# Patient Record
Sex: FEMALE | URBAN - METROPOLITAN AREA
[De-identification: names, ages, dates, MRNs, and addresses within clinical notes are randomized per-mention and may not be internally consistent; named-entity substitution may affect disease eponyms.]

---

## 2022-02-04 ENCOUNTER — HOSPITAL ENCOUNTER (OUTPATIENT)
Dept: PHYSICAL THERAPY | Age: 22
End: 2022-02-04

## 2022-02-15 ENCOUNTER — HOSPITAL ENCOUNTER (OUTPATIENT)
Dept: PHYSICAL THERAPY | Age: 22
Discharge: HOME | End: 2022-02-15
Payer: COMMERCIAL

## 2022-02-15 PROCEDURE — 97110 THERAPEUTIC EXERCISES: CPT

## 2022-02-15 PROCEDURE — 97535 SELF CARE MNGMENT TRAINING: CPT

## 2022-02-15 PROCEDURE — 97161 PT EVAL LOW COMPLEX 20 MIN: CPT

## 2022-02-15 NOTE — PROGRESS NOTES
[facility]  [address]  [place], [address]  [phone] fax    Plan of Care/ Statement of Necessity for Physical Therapy Services    Patient name: Annette Miller Start of Care: 2/15/2022   Referral source: Mk Myers NP : 2000    Medical Diagnosis: Low back pain [M54.50]  Payor: Javier Wheeler / Plan: Enriqueta Ruffin PPO / Product Type: PPO /  Onset Date:2021    Treatment Diagnosis: LBP   Prior Hospitalization: see medical history Provider#: [id number]   Medications: Verified on Patient summary List    Comorbidities: None reported   Prior Level of Function: Pt unlimited with sitting and activity participation     The Plan of Care and following information is based on the information from the initial evaluation. Assessment/ key information: The pt is a 23 y/o F presenting with c/o left sided LBP that will refer into left LE intermittently. She reports uncertainty of mechanism but reports most severe pain around November that has improved at this time. She is however limited with sitting tolerance and activity participation due to intermittent symptoms. Pt demonstrate provocation of pain with combined trunk flexion/rotation to right. Also noted with palpation at L5-S1 region and sacral ala, to lesser extent in left lumbar paraspinals. Good hip mobility noted bilaterally, some limitation to hip strength today. Negative TTP through hip musculature or with piriformis stress testing. Signs and symptoms consistent with mechanical back/SIJ pain. Pt would benefit from PT to improve ROM, strength and pain to return to PLOF.     Evaluation Complexity History LOW Complexity : Zero comorbidities / personal factors that will impact the outcome / POC; Examination HIGH Complexity : 4+ Standardized tests and measures addressing body structure, function, activity limitation and / or participation in recreation  ;Presentation LOW Complexity : Stable, uncomplicated ;Clinical Decision Making MEDIUM Complexity : FOTO score of 26-74  Overall Complexity Rating: LOW   Problem List: pain affecting function, decrease ROM, decrease strength, decrease ADL/ functional abilitiies, decrease activity tolerance and decrease flexibility/ joint mobility   Treatment Plan may include any combination of the following: Therapeutic exercise, Therapeutic activities, Neuromuscular re-education, Physical agent/modality, Manual therapy, Patient education, Self Care training and Functional mobility training  Patient / Family readiness to learn indicated by: asking questions and trying to perform skills  Persons(s) to be included in education: patient (P)  Barriers to Learning/Limitations: yes;  other scheduling  Patient Goal (s): Try to relieve the pain  Patient Self Reported Health Status: excellent  Rehabilitation Potential: excellent    Short Term Goals: To be accomplished in 2 weeks:  1. Pt will demonstrate I and compliance with HEP to maximize therapeutic effect. 2. Pt will demonstrate WNL flexion + rotation trunk AROM B without discomfort to improve posterior chain flexibility with ADLs. Long Term Goals: To be accomplished in 4 weeks:  1. Pt will demonstrate neutral pelvic alignment x 3 sessions to improve lumbopelvic mechanics with ADLs. 2. Pt will demonstrate 5/5 B hip abduction and extension for improved core stability with recreation. 3. Pt will report abolishment of left LE paresthesias to improve ease of classroom and work performance. 4. Pt will improve FOTO score to 84 to demonstrate improved quality of life and function. Frequency / Duration: Patient to be seen 2 times per week for 4 weeks.     Patient/ Caregiver education and instruction: Diagnosis, prognosis, self care, activity modification and exercises   [x]  Plan of care has been reviewed with MOUSTAPHA Moseley DPT CMTPT 2/15/2022 1:31 PM    ________________________________________________________________________    I certify that the above Therapy Services are being furnished while the patient is under my care. I agree with the treatment plan and certify that this therapy is necessary.     [de-identified] Signature:____________Date:_________TIME:________     Lloyd Swenson NP  ** Signature, Date and Time must be completed for valid certification **    Please sign and return to [facility] [address] [place] [address] & [facility]  [address]  [place], [address]  [phone] fax

## 2022-02-15 NOTE — PROGRESS NOTES
PT DAILY TREATMENT NOTE     Patient Name: Naomi Galvez  Date:2/15/2022  : 2000  [x]  Patient  Verified  Payor: Asael Yepez / Plan: [place] [employer] P[place] / Product Type: PPO /    In time:11:14  Out time:11:59  Total Treatment Time (min): 44  Visit #: 1 of 8    Treatment Area: Low back pain [M54.50]    SUBJECTIVE  Pain Level (0-10 scale): 2  Any medication changes, allergies to medications, adverse drug reactions, diagnosis change, or new procedure performed?: [x] No    [] Yes (see summary sheet for update)  Subjective functional status/changes:   [] No changes reported  The pt reports pain with sitting that limits her ADL ease    OBJECTIVE    24 min [x]Eval                  []Re-Eval       10 min Therapeutic Exercise:  [] See flow sheet :   Rationale: increase ROM, increase strength and increase proprioception to improve the patients ability to perform ADLs    10 min Therapeutic Activity:  []  See flow sheet :   Rationale: pt education and instruction  to improve the patients ability to self manage symptoms    With   [] TE   [] TA   [] neuro   [] other: Patient Education: [x] Review HEP    [] Progressed/Changed HEP based on:   [] positioning   [] body mechanics   [] transfers   [] heat/ice application    [] other:      Other Objective/Functional Measures:      Pain Level (0-10 scale) post treatment: 3    ASSESSMENT/Changes in Function: see POC    Patient will continue to benefit from skilled PT services to modify and progress therapeutic interventions, address functional mobility deficits, address ROM deficits, address strength deficits, analyze and address soft tissue restrictions, analyze and cue movement patterns and analyze and modify body mechanics/ergonomics to attain remaining goals. [x]  See Plan of Care  []  See progress note/recertification  []  See Discharge Summary         Progress towards goals / Updated goals:  Short Term Goals: To be accomplished in 2 weeks:  1.  Pt will demonstrate I and compliance with HEP to maximize therapeutic effect. IE: HEP issued and instructed  2. Pt will demonstrate WNL flexion + rotation trunk AROM B without discomfort to improve posterior chain flexibility with ADLs. IE: (+) pain to right  Long Term Goals: To be accomplished in 4 weeks:  1. Pt will demonstrate neutral pelvic alignment x 3 sessions to improve lumbopelvic mechanics with ADLs. IE: left upslip noted  2. Pt will demonstrate 5/5 B hip abduction and extension for improved core stability with recreation. IE: 4/5 abduction 5/5 extension some lumbar tension  3. Pt will report abolishment of left LE paresthesias to improve ease of classroom and work performance. IE: noted with prolonged sitting  4. Pt will improve FOTO score to 84 to demonstrate improved quality of life and function.    IE: 76    PLAN  []  Upgrade activities as tolerated     [x]  Continue plan of care  []  Update interventions per flow sheet       []  Discharge due to:_  []  Other:_      Karen Campos PT CMTPT 2/15/2022  1:46 PM    Future Appointments   Date Time Provider Brandan Carson   2/24/2022 12:00 PM Julio Cesar Jhaveri [facility]   2/25/2022 11:15 AM [place], [address] [facility]   3/1/2022  9:15 AM Chris Faust, PT [facility]   3/3/2022 12:00 PM Jenniffer Mcknight, PT [facility]   3/15/2022  9:15 AM Chris Faust, PT [facility]   3/17/2022 11:15 AM Kostas Galindo [facility]   3/22/2022  9:15 AM Chris Faust, PT [facility]   3/24/2022 11:15 AM Kostas Galindo [facility]

## 2022-02-24 ENCOUNTER — HOSPITAL ENCOUNTER (OUTPATIENT)
Dept: PHYSICAL THERAPY | Age: 22
Discharge: HOME | End: 2022-02-24
Payer: COMMERCIAL

## 2022-02-24 PROCEDURE — 97112 NEUROMUSCULAR REEDUCATION: CPT

## 2022-02-24 PROCEDURE — 97140 MANUAL THERAPY 1/> REGIONS: CPT

## 2022-02-24 PROCEDURE — 97110 THERAPEUTIC EXERCISES: CPT

## 2022-02-24 NOTE — PROGRESS NOTES
PT DAILY TREATMENT NOTE     Patient Name: Tiffanie Guajardo  Date:2022  : 2000  [x]  Patient  Verified  Payor: Benson Delgado / Plan: [place] [employer] [place] / Product Type: PPO /    In time:12:00  Out time:12:42  Total Treatment Time (min): 42  Visit #: 2 of 8    Treatment Area: Low back pain [M54.50]    SUBJECTIVE  Pain Level (0-10 scale): 2  Any medication changes, allergies to medications, adverse drug reactions, diagnosis change, or new procedure performed?: [x] No    [] Yes (see summary sheet for update)  Subjective functional status/changes:   [] No changes reported  The pt reports today is the first day she has felt noticeable discomfort. Not radiating much past the buttock though    OBJECTIVE    11 min Therapeutic Exercise:  [] See flow sheet :   Rationale: increase ROM and increase strength to improve the patients ability to perform daily tasks and self care     23 min Neuromuscular Re-education:  []  See flow sheet :   Rationale: increase strength and increase proprioception  to improve the patients ability to perform functional tasks with improved mechanics and stability    8 min Manual Therapy:  Left Sacral PAs grade III, left LE traction in prone for each   The manual therapy interventions were performed at a separate and distinct time from the therapeutic activities interventions. Rationale: decrease pain and increase tissue extensibility to improve ease of ADLs            With   [] TE   [] TA   [] neuro   [] other: Patient Education: [x] Review HEP    [] Progressed/Changed HEP based on:   [] positioning   [] body mechanics   [] transfers   [] heat/ice application    [] other:      Other Objective/Functional Measures: good pelvic stability noted post session      Pain Level (0-10 scale) post treatment: 0    ASSESSMENT/Changes in Function: The pt demonstrates excellent performance of exercise today.  Reporting improved symptoms since evaluation, some TTP at left sacral ala still present but improved sitting tolerance. Continue with gradual progression of core stability work     Patient will continue to benefit from skilled PT services to modify and progress therapeutic interventions, address functional mobility deficits, address ROM deficits, address strength deficits, analyze and address soft tissue restrictions, analyze and cue movement patterns and analyze and modify body mechanics/ergonomics to attain remaining goals. []  See Plan of Care  []  See progress note/recertification  []  See Discharge Summary         Progress towards goals / Updated goals:  Short Term Goals: To be accomplished in 2 weeks:  1. Pt will demonstrate I and compliance with HEP to maximize therapeutic effect. IE: HEP issued and instructed   Current: Met- compliant with HEP 2/24/2022  2. Pt will demonstrate WNL flexion + rotation trunk AROM B without discomfort to improve posterior chain flexibility with ADLs. IE: (+) pain to right  Long Term Goals: To be accomplished in 4 weeks:  1. Pt will demonstrate neutral pelvic alignment x 3 sessions to improve lumbopelvic mechanics with ADLs. IE: left upslip noted  2. Pt will demonstrate 5/5 B hip abduction and extension for improved core stability with recreation. IE: 4/5 abduction 5/5 extension some lumbar tension  3. Pt will report abolishment of left LE paresthesias to improve ease of classroom and work performance. IE: noted with prolonged sitting  4. Pt will improve FOTO score to 84 to demonstrate improved quality of life and function.               IE: 76    PLAN  []  Upgrade activities as tolerated     []  Continue plan of care  []  Update interventions per flow sheet       []  Discharge due to:_  []  Other:_      Gaye Ackerman 2/24/2022  12:00 PM    Future Appointments   Date Time Provider Brandan Carson   2/25/2022 11:15 AM Celso [address] [place] [address]   3/1/2022  9:15 AM Carmelo Shin PT [facility]   3/3/2022 12:00 PM Kristen Light, PT [facility]   3/15/2022  9:15 AM Radha Marin, PT [facility]   3/17/2022 11:15 AM Donna Sorensen [facility]   3/22/2022  9:15 AM Radha Marin, PT [facility]   3/24/2022 11:15 AM Donna Sorensen [facility]

## 2022-02-25 ENCOUNTER — HOSPITAL ENCOUNTER (OUTPATIENT)
Dept: PHYSICAL THERAPY | Age: 22
Discharge: HOME | End: 2022-02-25
Payer: COMMERCIAL

## 2022-02-25 PROCEDURE — 97140 MANUAL THERAPY 1/> REGIONS: CPT

## 2022-02-25 PROCEDURE — 97112 NEUROMUSCULAR REEDUCATION: CPT

## 2022-02-25 PROCEDURE — 97110 THERAPEUTIC EXERCISES: CPT

## 2022-02-25 NOTE — PROGRESS NOTES
PT DAILY TREATMENT NOTE     Patient Name: Aarti Torres  Date:2022  : 2000  [x]  Patient  Verified  Payor: Jose L Martínez / Plan: [place] [employer] PPO / Product Type: PPO /    In time:11:15  Out time:11:49  Total Treatment Time (min): 34  Visit #: 3 of 8    Treatment Area: Low back pain [M54.50]    SUBJECTIVE  Pain Level (0-10 scale): 0  Any medication changes, allergies to medications, adverse drug reactions, diagnosis change, or new procedure performed?: [x] No    [] Yes (see summary sheet for update)  Subjective functional status/changes:   [] No changes reported  The pt denies any soreness or pain. No issues after yesterday's session    OBJECTIVE    10 min Therapeutic Exercise:  [] See flow sheet :   Rationale: increase ROM and increase strength to improve the patients ability to perform daily tasks and self care    16 min Neuromuscular Re-education:  []  See flow sheet :   Rationale: increase strength and increase proprioception  to improve the patients ability to perform activities with improved core stability and engagement    8 min Manual Therapy:  Left sacral PAs grade III in prone, left anterior innominate MET   The manual therapy interventions were performed at a separate and distinct time from the therapeutic activities interventions. Rationale: increase ROM and increase tissue extensibility to improve mechanics and stability with ADLs         With   [] TE   [] TA   [] neuro   [] other: Patient Education: [x] Review HEP    [] Progressed/Changed HEP based on:   [] positioning   [] body mechanics   [] transfers   [] heat/ice application    [] other:      Other Objective/Functional Measures:      Pain Level (0-10 scale) post treatment: 0    ASSESSMENT/Changes in Function: The pt demonstrates some improved sacral alignment today, mild innominate rotation. Continued good form and response to exercise, added UE Reformer series, pt reports some mild stretch discomfort through left sacral region.  Good alignment stability through session, no pain post session. Continue with core stability work progressing positions as able    Patient will continue to benefit from skilled PT services to modify and progress therapeutic interventions, address functional mobility deficits, address ROM deficits, address strength deficits, analyze and address soft tissue restrictions, analyze and cue movement patterns and analyze and modify body mechanics/ergonomics to attain remaining goals. []  See Plan of Care  []  See progress note/recertification  []  See Discharge Summary         Progress towards goals / Updated goals:  Short Term Goals: To be accomplished in 2 weeks:  1. Pt will demonstrate I and compliance with HEP to maximize therapeutic effect.              BG: HEP issued and instructed              Current: Met- compliant with HEP 2/24/2022  2. Pt will demonstrate WNL flexion + rotation trunk AROM B without discomfort to improve posterior chain flexibility with ADLs.             GY: (+) pain to right   Current: mild soreness to right but improved 2/25/2022  Long Term Goals: To be accomplished in 4 weeks:  1. Pt will demonstrate neutral pelvic alignment x 3 sessions to improve lumbopelvic mechanics with ADLs.             FP: left upslip noted   Current: progressing, mild anterior rotation left 2/25/2022  2. Pt will demonstrate 5/5 B hip abduction and extension for improved core stability with recreation.              IE: 4/5 abduction 5/5 extension some lumbar tension  3. Pt will report abolishment of left LE paresthesias to improve ease of classroom and work performance.              IE: noted with prolonged sitting  4.  Pt will improve FOTO score to 84 to demonstrate improved quality of life and function.              IE: 68    PLAN  [x]  Upgrade activities as tolerated     []  Continue plan of care  []  Update interventions per flow sheet       []  Discharge due to:_  []  Other:_      Jeffrey Ingram DPT CMTPT 2/25/2022  11:22 AM    Future Appointments   Date Time Provider Brandan Carson   3/1/2022  9:15 AM Sara Carrier, PT [facility]   3/3/2022 12:00 PM Beth Belle, PT [facility]   3/15/2022  9:15 AM Sara Carrier, PT [facility]   3/17/2022 11:15 AM Deloras Dage [facility]   3/22/2022  9:15 AM Sara Carrier, PT [facility]   3/24/2022 11:15 AM Deloras Dage [facility]

## 2022-03-01 ENCOUNTER — HOSPITAL ENCOUNTER (OUTPATIENT)
Dept: PHYSICAL THERAPY | Age: 22
Discharge: HOME | End: 2022-03-01
Payer: COMMERCIAL

## 2022-03-01 PROCEDURE — 97140 MANUAL THERAPY 1/> REGIONS: CPT

## 2022-03-01 PROCEDURE — 97110 THERAPEUTIC EXERCISES: CPT

## 2022-03-01 PROCEDURE — 97112 NEUROMUSCULAR REEDUCATION: CPT

## 2022-03-01 NOTE — PROGRESS NOTES
PT DAILY TREATMENT NOTE     Patient Name: Tatianna Hinkle  Date:3/1/2022  : 2000  [x]  Patient  Verified  Payor: Mo Bradshaw / Plan: [place] [employer] PPO / Product Type: PPO /    In time:911  Out time:949  Total Treatment Time (min): 38  Visit #: 4 of 8      Treatment Area: Low back pain [M54.50]    SUBJECTIVE  Pain Level (0-10 scale): 0  Any medication changes, allergies to medications, adverse drug reactions, diagnosis change, or new procedure performed?: [x] No    [] Yes (see summary sheet for update)  Subjective functional status/changes:   [] No changes reported  The pt reports some increased soreness after playing [employer]. OBJECTIVE      10 min Therapeutic Exercise:  [x] See flow sheet :   Rationale: increase ROM and increase strength to improve the patients ability to perform ADL       20 min Neuromuscular Re-education:  [x]  See flow sheet :   Rationale: increase strength, improve coordination and increase proprioception  to improve the patients ability to perform ADL    8 min Manual Therapy:  Pt prone- performed STM to lower lumbar paraspinals and use STM hook instrument to the left glute max, left LE distraction   The manual therapy interventions were performed at a separate and distinct time from the therapeutic activities interventions. Rationale: decrease pain, increase ROM and increase tissue extensibility to perform ADL            With   [] TE   [] TA   [] neuro   [] other: Patient Education: [x] Review HEP    [] Progressed/Changed HEP based on:   [] positioning   [] body mechanics   [] transfers   [] heat/ice application    [] other:      Other Objective/Functional Measures: progressed therex per flow sheet, level SI alignment     Pain Level (0-10 scale) post treatment: 0    ASSESSMENT/Changes in Function:  The pt was challenged with side plank with hip abduction. Good SI alignment noted today. No pain increase reported throughout treatment.      Patient will continue to benefit from skilled PT services to modify and progress therapeutic interventions, address functional mobility deficits, address strength deficits, analyze and address soft tissue restrictions and analyze and cue movement patterns to attain remaining goals. []  See Plan of Care  []  See progress note/recertification  []  See Discharge Summary         Progress towards goals / Updated goals:  Short Term Goals: To be accomplished in 2 weeks:  1. Pt will demonstrate I and compliance with HEP to maximize therapeutic effect.              DW: HEP issued and instructed              [id number]: Met- compliant with HEP 2/24/2022  2. Pt will demonstrate WNL flexion + rotation trunk AROM B without discomfort to improve posterior chain flexibility with ADLs.             FY: (+) pain to right              Current: mild soreness to right but improved 2/25/2022  Long Term Goals: To be accomplished in 4 weeks:  1. Pt will demonstrate neutral pelvic alignment x 3 sessions to improve lumbopelvic mechanics with ADLs.             WR: left upslip noted              Current:first level alignment noted 3-1-22  2. Pt will demonstrate 5/5 B hip abduction and extension for improved core stability with recreation.              IE: 4/5 abduction 5/5 extension some lumbar tension  3. Pt will report abolishment of left LE paresthesias to improve ease of classroom and work performance.              IE: noted with prolonged sitting  4.  Pt will improve FOTO score to 84 to demonstrate improved quality of life and function.              IE: 68    PLAN  []  Upgrade activities as tolerated     [x]  Continue plan of care  []  Update interventions per flow sheet       []  Discharge due to:_  []  Other:_      Jeffrey Rosen, PT 3/1/2022  9:05 AM    Future Appointments   Date Time Provider Brandan Carson   3/1/2022  9:15 AM Dayana Irving, PT [facility]   3/3/2022 12:00 PM Boris Yang, PT [facility]   3/15/2022  9:15 AM Zander Edwards, PT [facility] 3/17/2022 11:15 AM Mitch Leong [facility]   3/22/2022  9:15 AM Colletta Patch, PT [facility]   3/24/2022 11:15 [facility]

## 2022-03-03 ENCOUNTER — HOSPITAL ENCOUNTER (OUTPATIENT)
Dept: PHYSICAL THERAPY | Age: 22
Discharge: HOME | End: 2022-03-03
Payer: COMMERCIAL

## 2022-03-03 PROCEDURE — 97140 MANUAL THERAPY 1/> REGIONS: CPT

## 2022-03-03 PROCEDURE — 97112 NEUROMUSCULAR REEDUCATION: CPT

## 2022-03-03 PROCEDURE — 97110 THERAPEUTIC EXERCISES: CPT

## 2022-03-03 NOTE — PROGRESS NOTES
PT DAILY TREATMENT NOTE     Patient Name: Unknown Sandifer  Date:3/3/2022  : 2000  [x]  Patient  Verified  Payor: Lashonda Arreola / Plan: [place] [employer] / Product Type: PPO /    In time:11:58  Out time: 12:39  Total Treatment Time (min): 41  Visit #: 5 of 8    Treatment Area: Low back pain [M54.50]    SUBJECTIVE  Pain Level (0-10 scale): 0/10  Any medication changes, allergies to medications, adverse drug reactions, diagnosis change, or new procedure performed?: [x] No    [] Yes (see summary sheet for update)  Subjective functional status/changes:   [] No changes reported  The patient reports \"just a little soreness with certain motions\". She indicates her pain has improved. OBJECTIVE  18 min Therapeutic Exercise:  [x] See flow sheet :   Rationale: increase ROM and increase strength to improve the patients ability to improve ADL ease. 15 min Neuromuscular Re-education:  [x]  See flow sheet :   Rationale: increase ROM and increase strength  to improve the patients ability to improve ADL ease. 8 min Manual Therapy:  Left glute medius TPR/MFR with the patient in prone   The manual therapy interventions were performed at a separate and distinct time from the therapeutic activities interventions. Rationale: decrease pain, increase ROM and increase tissue extensibility to improve ADL ease. With   [] TE   [] TA   [] neuro   [] other: Patient Education: [x] Review HEP    [] Progressed/Changed HEP based on:   [] positioning   [] body mechanics   [] transfers   [] heat/ice application    [] other:      Other Objective/Functional Measures:   Pelvic alignment WNL     Pain Level (0-10 scale) post treatment: 0/10    ASSESSMENT/Changes in Function: Cues required in order to maintain pelvic neutral during core strengthening with notable anterior pelvic tilt tendency with core weakness evident. The patient is going out of town for a week and plans to return for treatment upon her return.      Patient will continue to benefit from skilled PT services to modify and progress therapeutic interventions, address functional mobility deficits, address ROM deficits, address strength deficits, analyze and address soft tissue restrictions, analyze and cue movement patterns, analyze and modify body mechanics/ergonomics, assess and modify postural abnormalities and instruct in home and community integration to attain remaining goals. []  See Plan of Care  []  See progress note/recertification  []  See Discharge Summary         Progress towards goals / Updated goals:  Short Term Goals: To be accomplished in 2 weeks:  1. Pt will demonstrate I and compliance with HEP to maximize therapeutic effect.              HR: HEP issued and instructed              [id number]: Met- compliant with HEP 2/24/2022  2. Pt will demonstrate WNL flexion + rotation trunk AROM B without discomfort to improve posterior chain flexibility with ADLs.             ON: (+) pain to right              Current: mild soreness to right but improved 2/25/2022  Long Term Goals: To be accomplished in 4 weeks:  1. Pt will demonstrate neutral pelvic alignment x 3 sessions to improve lumbopelvic mechanics with ADLs.             SH: left upslip noted              Current:Level x 2 today 3/03/2022  2. Pt will demonstrate 5/5 B hip abduction and extension for improved core stability with recreation.              IE: 4/5 abduction 5/5 extension some lumbar tension  3. Pt will report abolishment of left LE paresthesias to improve ease of classroom and work performance.              IE: noted with prolonged sitting  4.  Pt will improve FOTO score to 84 to demonstrate improved quality of life and function.              IE: 68    PLAN  [x]  Upgrade activities as tolerated     []  Continue plan of care  []  Update interventions per flow sheet       []  Discharge due to:_  []  Other:_      Ray Kumar PT 3/3/2022  12:05 PM    Future Appointments   Date Time Provider Brandan Carson   3/15/2022  9:15 AM Chris Faust, PT [facility]   3/17/2022 11:15 AM Kostas [facility]   3/22/2022  9:15 AM Chris Faust, PT [facility]   3/24/2022 11:15 AM Kostas Galindo [facility]

## 2022-03-15 ENCOUNTER — HOSPITAL ENCOUNTER (OUTPATIENT)
Dept: PHYSICAL THERAPY | Age: 22
Discharge: HOME | End: 2022-03-15
Payer: COMMERCIAL

## 2022-03-15 PROCEDURE — 97112 NEUROMUSCULAR REEDUCATION: CPT

## 2022-03-15 PROCEDURE — 97140 MANUAL THERAPY 1/> REGIONS: CPT

## 2022-03-15 PROCEDURE — 97110 THERAPEUTIC EXERCISES: CPT

## 2022-03-15 NOTE — PROGRESS NOTES
PT DAILY TREATMENT NOTE     Patient Name: Ramesh William  Date:3/15/2022  : 2000  [x]  Patient  Verified  Payor: Vernon Marks / [place]: [place] [employer] / Product Type: PPO /    In JXOF:[id number]  Out time:0955  Total Treatment Time (min): 40  Visit #: 6 of 8      Treatment Area: Low back pain [M54.50]    SUBJECTIVE  Pain Level (0-10 scale): 0  Any medication changes, allergies to medications, adverse drug reactions, diagnosis change, or new procedure performed?: [x] No    [] Yes (see summary sheet for update)  Subjective functional status/changes:   [] No changes reported  The pt reports her pain is no longer constant but she still does feel it with certain movements. OBJECTIVE        17 min Therapeutic Exercise:  [x] See flow sheet :   Rationale: increase ROM and increase strength to improve the patients ability to perform ADL       15 min Neuromuscular Re-education:  [x]  See flow sheet : Reformer series, Davon chops, lifts   Rationale: increase strength, improve coordination and increase proprioception  to improve the patients ability to perform ADL    8 min Manual Therapy:  STM left lumbar paraspinals, TPR glute med and STM stick to glute med- pt prone   The manual therapy interventions were performed at a separate and distinct time from the therapeutic activities interventions. Rationale: decrease pain, increase ROM and increase tissue extensibility to perform ADL            With   [] TE   [] TA   [] neuro   [] other: Patient Education: [x] Review HEP    [] Progressed/Changed HEP based on:   [] positioning   [] body mechanics   [] transfers   [] heat/ice application    [] other:      Other Objective/Functional Measures: added clams ER     Pain Level (0-10 scale) post treatment: 0    ASSESSMENT/Changes in Function: The pt is progressing well with PT. Pain is now intermittent and she is progressing with stability.      Patient will continue to benefit from skilled PT services to modify and progress therapeutic interventions, address functional mobility deficits, address ROM deficits, address strength deficits, analyze and address soft tissue restrictions, analyze and cue movement patterns and analyze and modify body mechanics/ergonomics to attain remaining goals. []  See Plan of Care  []  See progress note/recertification  []  See Discharge Summary         Progress towards goals / Updated goals:  Short Term Goals: To be accomplished in 2 weeks:  1. Pt will demonstrate I and compliance with HEP to maximize therapeutic effect.              WL: HEP issued and instructed              IHWLAZE: Met- compliant with HEP 2/24/2022  2. Pt will demonstrate WNL flexion + rotation trunk AROM B without discomfort to improve posterior chain flexibility with ADLs.             LETICIA: (+) pain to right              Current: mild soreness to right but improved 2/25/2022  Long Term Goals: To be accomplished in 4 weeks:  1. Pt will demonstrate neutral pelvic alignment x 3 sessions to improve lumbopelvic mechanics with ADLs.             AN: left upslip noted              Current:Level x 2 today 3/03/2022  2. Pt will demonstrate 5/5 B hip abduction and extension for improved core stability with recreation.              IE: 4/5 abduction 5/5 extension some lumbar tension  3. Pt will report abolishment of left LE paresthesias to improve ease of classroom and work performance.              IE: noted with prolonged sitting  4.  Pt will improve FOTO score to 84 to demonstrate improved quality of life and function.              IE: 68    PLAN  []  Upgrade activities as tolerated     [x]  Continue plan of care  []  Update interventions per flow sheet       []  Discharge due to:_  []  Other:_      Jordy Teran, PT 3/15/2022  9:22 AM    Future Appointments   Date Time Provider Brandan Powelli   3/17/2022 11:15 AM Cel[place], [address] [employer] [facility]   3/22/2022  9:15 AM Albert Sutton, PT [facility]   3/24/2022 11:15 AM Sherie Lucas [facility]

## 2022-03-17 ENCOUNTER — HOSPITAL ENCOUNTER (OUTPATIENT)
Dept: PHYSICAL THERAPY | Age: 22
Discharge: HOME | End: 2022-03-17
Payer: COMMERCIAL

## 2022-03-17 PROCEDURE — 97110 THERAPEUTIC EXERCISES: CPT

## 2022-03-17 PROCEDURE — 97112 NEUROMUSCULAR REEDUCATION: CPT

## 2022-03-17 PROCEDURE — 97140 MANUAL THERAPY 1/> REGIONS: CPT

## 2022-03-17 NOTE — PROGRESS NOTES
[facility]  [address]  [place], [address]  [phone] fax    Physical Therapy Discharge Summary  Patient name: Geoff Helms Start of Care: 2/15/2022   Referral source: Seymour Bender NP : 2000                Medical Diagnosis: Low back pain [M54.50]  Payor: Poornima Morris / Plan: [employer] PPO / Product Type: PPO /  Onset Date:2021                Treatment Diagnosis: LBP   Prior Hospitalization: see medical history Provider#: [id number]   Medications: Verified on Patient summary List    Comorbidities: None reported   Prior Level of Function: Pt unlimited with sitting and activity participation  Visits from Start of Care: 7    Missed Visits: 0  Reporting Period : 2/15/2022 to 3/17/2022      Summary of Care:  Short Term Goals: To be accomplished in 2 weeks:  1. Pt will demonstrate I and compliance with HEP to maximize therapeutic effect.              IJ: HEP issued and instructed              [id number]: Met- compliant with HEP 2022  2. Pt will demonstrate WNL flexion + rotation trunk AROM B without discomfort to improve posterior chain flexibility with ADLs.             QC: (+) pain to right              Current: mild soreness to right but improved 2022; noted intermittently to right 3/17/2022  Long Term Goals: To be accomplished in 4 weeks:  1. Pt will demonstrate neutral pelvic alignment x 3 sessions to improve lumbopelvic mechanics with ADLs.             BR: left upslip noted              Current:Level x 2 today 3/03/2022  2. Pt will demonstrate 5/5 B hip abduction and extension for improved core stability with recreation.              IE: 4/5 abduction 5/5 extension some lumbar tension              Current: Met 5/5 each without pain 3/17/2022  3.  Pt will report abolishment of left LE paresthesias to improve ease of classroom and work performance.              IE: noted with prolonged sitting              Current: Met pt reports no LE paresthesias 3/17/2022  4. Pt will improve FOTO score to 84 to demonstrate improved quality of life and function.              IE: 68              Current: met 94 3/17/2022    ASSESSMENT/RECOMMENDATIONS:  The pt has progressed very well [facility]. She reports abolishment of LE paresthesias and only intermittent soreness through her left lumbar region if stretching a specific way. Pt is ready to D/C to HEP continuance at this time.     [x]Discontinue therapy: [x]Patient has reached or is progressing toward set goals      []Patient is non-compliant or has abdicated      []Due to lack of appreciable progress towards set goals    Africa Matthews DPT CMTPT 3/17/2022 3:15 PM

## 2022-03-17 NOTE — PROGRESS NOTES
PT DAILY TREATMENT NOTE     Patient Name: Annette Miller  Date:3/17/2022  : 2000  [x]  Patient  Verified  Payor: Javier Pinch / Plan: [place] [employer] [place] / Product Type: PPO /    In time:11:14  Out time:11:48  Total Treatment Time (min): 34  Visit #: 7 of 8    Treatment Area: Low back pain [M54.50]    SUBJECTIVE  Pain Level (0-10 scale): 0  Any medication changes, allergies to medications, adverse drug reactions, diagnosis change, or new procedure performed?: [x] No    [] Yes (see summary sheet for update)  Subjective functional status/changes:   [] No changes reported  The pt reports some soreness through her left side from time to time if she stretches a certain way. Otherwise doing well    OBJECTIVE    11 min Therapeutic Exercise:  [] See flow sheet :   Rationale: increase ROM and increase strength to improve the patients ability to perform daily tasks     15 min Neuromuscular Re-education:  []  See flow sheet :   Rationale: increase strength, improve coordination and increase proprioception  to improve the patients ability to perform functional tasks with improved mechanics and core stability    8 min Manual Therapy:  SIJ check, STM left lumbar paraspinals, brief anterior innominate MET    The manual therapy interventions were performed at a separate and distinct time from the therapeutic activities interventions. Rationale: increase tissue extensibility and increase postural awareness to improve ease of ADLs and exercise performance          With   [] TE   [] TA   [] neuro   [] other: Patient Education: [x] Review HEP    [] Progressed/Changed HEP based on:   [] positioning   [] body mechanics   [] transfers   [] heat/ice application    [] other:      Other Objective/Functional Measures:      Pain Level (0-10 scale) post treatment: 0    ASSESSMENT/Changes in Function: The pt has progressed very well since [facility].  She reports abolishment of LE paresthesias and only intermittent soreness through her left lumbar region if stretching a specific way. Pt is ready to D/C to HEP continuance at this time. []  See Plan of Care  []  See progress note/recertification  [x]  See Discharge Summary         Progress towards goals / Updated goals:  Short Term Goals: To be accomplished in 2 weeks:  1. Pt will demonstrate I and compliance with HEP to maximize therapeutic effect.              OE: HEP issued and instructed              [id number]: Met- compliant with HEP 2/24/2022  2. Pt will demonstrate WNL flexion + rotation trunk AROM B without discomfort to improve posterior chain flexibility with ADLs.             OCHOA: (+) pain to right              Current: mild soreness to right but improved 2/25/2022; noted intermittently to right 3/17/2022  Long Term Goals: To be accomplished in 4 weeks:  1. Pt will demonstrate neutral pelvic alignment x 3 sessions to improve lumbopelvic mechanics with ADLs.             FG: left upslip noted              Current:Level x 2 today 3/03/2022  2. Pt will demonstrate 5/5 B hip abduction and extension for improved core stability with recreation.              IE: 4/5 abduction 5/5 extension some lumbar tension   Current: Met 5/5 each without pain 3/17/2022  3. Pt will report abolishment of left LE paresthesias to improve ease of classroom and work performance.              IE: noted with prolonged sitting   Current: Met pt reports no LE paresthesias 3/17/2022  4.  Pt will improve FOTO score to 84 to demonstrate improved quality of life and function.              IE: 68   Current: met 94 3/17/2022    PLAN  []  Upgrade activities as tolerated     []  Continue plan of care  []  Update interventions per flow sheet       [x]  Discharge due to: achievement of goals  []  Other:_      Sandra Reyna DPT CMTPT 3/17/2022  11:20 AM    Future Appointments   Date Time Provider Brandan Carson   3/22/2022  9:15 AM Radha Marin PT [facility]   3/24/2022 11:15 AM Donna Sorensen [facility]

## 2022-03-22 ENCOUNTER — APPOINTMENT (OUTPATIENT)
Dept: PHYSICAL THERAPY | Age: 22
End: 2022-03-22
Payer: COMMERCIAL

## 2022-03-24 ENCOUNTER — APPOINTMENT (OUTPATIENT)
Dept: PHYSICAL THERAPY | Age: 22
End: 2022-03-24
Payer: COMMERCIAL